# Patient Record
Sex: MALE | Race: BLACK OR AFRICAN AMERICAN | NOT HISPANIC OR LATINO | URBAN - NONMETROPOLITAN AREA
[De-identification: names, ages, dates, MRNs, and addresses within clinical notes are randomized per-mention and may not be internally consistent; named-entity substitution may affect disease eponyms.]

---

## 2021-10-18 ENCOUNTER — IMPORTED ENCOUNTER (OUTPATIENT)
Dept: URBAN - NONMETROPOLITAN AREA CLINIC 1 | Facility: CLINIC | Age: 51
End: 2021-10-18

## 2021-10-18 PROBLEM — H40.013: Noted: 2021-10-18

## 2021-10-18 PROBLEM — H16.223: Noted: 2021-10-18

## 2021-10-18 PROBLEM — H52.4: Noted: 2021-10-18

## 2021-10-18 PROBLEM — H25.813: Noted: 2021-10-18

## 2021-10-18 PROBLEM — E11.9: Noted: 2021-10-18

## 2021-10-18 PROCEDURE — S0620 ROUTINE OPHTHALMOLOGICAL EXA: HCPCS

## 2021-10-18 NOTE — PATIENT DISCUSSION
Presbyopia-Discussed diagnosis with patient.-Updated spec Rx given. Recommend lens that will provide comfort as well as protect safety and health of eyes.-Ok to continue with OTC readersDM s -Stressed the importance of keeping blood sugars under control blood pressure under control and weight normalization and regular visits with PCP. -Explained the possible effects of poorly controlled diabetes and the damage that diabetes can cause to ocular health. -Patient to check [id number].-Pt instructed to contact our office with any vision changes. Cataract OU-Not yet surgical. -Reviewed symptoms of advancing cataract growth such as glare and halos and decreased vision.-Continue to monitor for now. Pt will notify us if any new symptoms develop. Glaucoma Suspect-Based on C/D age race and IOP-Monitor with exams and testing.-Order pachymetry and Order ONH OCT at next visitDES OU-Due to keratits and hyperemia start RESTASIS BID OU.   Rx sent to [facility]

## 2022-04-10 ASSESSMENT — VISUAL ACUITY
OD_CC: 20/30
OS_CC: 20/20

## 2022-04-10 ASSESSMENT — TONOMETRY
OD_IOP_MMHG: 17
OS_IOP_MMHG: 17

## 2024-04-24 ENCOUNTER — ESTABLISHED PATIENT (OUTPATIENT)
Dept: RURAL CLINIC 1 | Facility: CLINIC | Age: 54
End: 2024-04-24

## 2024-04-24 DIAGNOSIS — E11.9: ICD-10-CM

## 2024-04-24 DIAGNOSIS — H40.013: ICD-10-CM

## 2024-04-24 DIAGNOSIS — H16.223: ICD-10-CM

## 2024-04-24 DIAGNOSIS — H25.813: ICD-10-CM

## 2024-04-24 PROCEDURE — 99214 OFFICE O/P EST MOD 30 MIN: CPT

## 2024-04-24 ASSESSMENT — VISUAL ACUITY
OS_SC: 20/20-2
OD_SC: 20/25-2
OU_SC: 20/20-2
OU_CC: 20/25
OD_CC: 20/25
OS_CC: 20/25

## 2024-04-24 ASSESSMENT — TONOMETRY
OD_IOP_MMHG: 16
OS_IOP_MMHG: 16

## 2025-07-14 ENCOUNTER — COMPREHENSIVE EXAM (OUTPATIENT)
Age: 55
End: 2025-07-14

## 2025-07-14 DIAGNOSIS — H16.223: ICD-10-CM

## 2025-07-14 DIAGNOSIS — H25.813: ICD-10-CM

## 2025-07-14 DIAGNOSIS — E11.9: ICD-10-CM

## 2025-07-14 DIAGNOSIS — H40.013: ICD-10-CM

## 2025-07-14 PROCEDURE — 92014 COMPRE OPH EXAM EST PT 1/>: CPT
